# Patient Record
Sex: MALE | Race: WHITE | Employment: OTHER | URBAN - NONMETROPOLITAN AREA
[De-identification: names, ages, dates, MRNs, and addresses within clinical notes are randomized per-mention and may not be internally consistent; named-entity substitution may affect disease eponyms.]

---

## 2024-03-28 ENCOUNTER — HOSPITAL ENCOUNTER (INPATIENT)
Age: 74
LOS: 3 days | Discharge: HOME | DRG: 372 | End: 2024-03-31
Attending: STUDENT IN AN ORGANIZED HEALTH CARE EDUCATION/TRAINING PROGRAM | Admitting: SURGERY
Payer: MEDICARE

## 2024-03-28 PROBLEM — K35.80 ACUTE APPENDICITIS: Status: ACTIVE | Noted: 2024-03-28

## 2024-03-28 PROBLEM — K35.219 ACUTE APPENDICITIS WITH GENERALIZED PERITONITIS AND ABSCESS: Status: ACTIVE | Noted: 2024-03-28

## 2024-03-28 PROCEDURE — 6360000002 HC RX W HCPCS: Performed by: SURGERY

## 2024-03-28 PROCEDURE — 2580000003 HC RX 258: Performed by: SURGERY

## 2024-03-28 PROCEDURE — 1200000000 HC SEMI PRIVATE

## 2024-03-28 PROCEDURE — 99222 1ST HOSP IP/OBS MODERATE 55: CPT | Performed by: SURGERY

## 2024-03-28 RX ORDER — SODIUM CHLORIDE 0.9 % (FLUSH) 0.9 %
5-40 SYRINGE (ML) INJECTION PRN
Status: DISCONTINUED | OUTPATIENT
Start: 2024-03-28 | End: 2024-03-31 | Stop reason: HOSPADM

## 2024-03-28 RX ORDER — ROSUVASTATIN CALCIUM 10 MG/1
10 TABLET, COATED ORAL NIGHTLY
COMMUNITY

## 2024-03-28 RX ORDER — SODIUM CHLORIDE 9 MG/ML
INJECTION, SOLUTION INTRAVENOUS PRN
Status: DISCONTINUED | OUTPATIENT
Start: 2024-03-28 | End: 2024-03-31 | Stop reason: HOSPADM

## 2024-03-28 RX ORDER — POTASSIUM CHLORIDE 20 MEQ/1
40 TABLET, EXTENDED RELEASE ORAL PRN
Status: DISCONTINUED | OUTPATIENT
Start: 2024-03-28 | End: 2024-03-31 | Stop reason: HOSPADM

## 2024-03-28 RX ORDER — TADALAFIL 20 MG/1
20 TABLET ORAL PRN
COMMUNITY

## 2024-03-28 RX ORDER — UBIDECARENONE 30 MG
100 CAPSULE ORAL NIGHTLY
COMMUNITY

## 2024-03-28 RX ORDER — POTASSIUM CHLORIDE 7.45 MG/ML
10 INJECTION INTRAVENOUS PRN
Status: DISCONTINUED | OUTPATIENT
Start: 2024-03-28 | End: 2024-03-31 | Stop reason: HOSPADM

## 2024-03-28 RX ORDER — SODIUM CHLORIDE 9 MG/ML
INJECTION, SOLUTION INTRAVENOUS CONTINUOUS
Status: DISCONTINUED | OUTPATIENT
Start: 2024-03-28 | End: 2024-03-30

## 2024-03-28 RX ORDER — METOPROLOL SUCCINATE 25 MG/1
25 TABLET, EXTENDED RELEASE ORAL NIGHTLY
COMMUNITY

## 2024-03-28 RX ORDER — ASCORBIC ACID 500 MG
500 TABLET ORAL NIGHTLY
COMMUNITY

## 2024-03-28 RX ORDER — SPIRONOLACTONE 25 MG/1
12.5 TABLET ORAL NIGHTLY
Status: DISCONTINUED | OUTPATIENT
Start: 2024-03-28 | End: 2024-03-31 | Stop reason: HOSPADM

## 2024-03-28 RX ORDER — ONDANSETRON 2 MG/ML
4 INJECTION INTRAMUSCULAR; INTRAVENOUS EVERY 6 HOURS PRN
Status: DISCONTINUED | OUTPATIENT
Start: 2024-03-28 | End: 2024-03-31 | Stop reason: HOSPADM

## 2024-03-28 RX ORDER — MORPHINE SULFATE 2 MG/ML
2 INJECTION, SOLUTION INTRAMUSCULAR; INTRAVENOUS
Status: DISCONTINUED | OUTPATIENT
Start: 2024-03-28 | End: 2024-03-31 | Stop reason: HOSPADM

## 2024-03-28 RX ORDER — ROSUVASTATIN CALCIUM 10 MG/1
10 TABLET, COATED ORAL NIGHTLY
Status: DISCONTINUED | OUTPATIENT
Start: 2024-03-28 | End: 2024-03-31 | Stop reason: HOSPADM

## 2024-03-28 RX ORDER — SODIUM CHLORIDE 0.9 % (FLUSH) 0.9 %
5-40 SYRINGE (ML) INJECTION EVERY 12 HOURS SCHEDULED
Status: DISCONTINUED | OUTPATIENT
Start: 2024-03-28 | End: 2024-03-31 | Stop reason: HOSPADM

## 2024-03-28 RX ORDER — MORPHINE SULFATE 4 MG/ML
4 INJECTION, SOLUTION INTRAMUSCULAR; INTRAVENOUS
Status: DISCONTINUED | OUTPATIENT
Start: 2024-03-28 | End: 2024-03-31 | Stop reason: HOSPADM

## 2024-03-28 RX ORDER — METOPROLOL SUCCINATE 25 MG/1
25 TABLET, EXTENDED RELEASE ORAL NIGHTLY
Status: DISCONTINUED | OUTPATIENT
Start: 2024-03-28 | End: 2024-03-31 | Stop reason: HOSPADM

## 2024-03-28 RX ORDER — MAGNESIUM SULFATE IN WATER 40 MG/ML
2000 INJECTION, SOLUTION INTRAVENOUS PRN
Status: DISCONTINUED | OUTPATIENT
Start: 2024-03-28 | End: 2024-03-31 | Stop reason: HOSPADM

## 2024-03-28 RX ORDER — SPIRONOLACTONE 25 MG/1
12.5 TABLET ORAL NIGHTLY
COMMUNITY

## 2024-03-28 RX ADMIN — SODIUM CHLORIDE: 9 INJECTION, SOLUTION INTRAVENOUS at 19:11

## 2024-03-28 RX ADMIN — PIPERACILLIN AND TAZOBACTAM 4500 MG: 4; .5 INJECTION, POWDER, FOR SOLUTION INTRAVENOUS at 21:10

## 2024-03-28 ASSESSMENT — LIFESTYLE VARIABLES
HOW OFTEN DO YOU HAVE A DRINK CONTAINING ALCOHOL: MONTHLY OR LESS
HOW MANY STANDARD DRINKS CONTAINING ALCOHOL DO YOU HAVE ON A TYPICAL DAY: 1 OR 2

## 2024-03-28 NOTE — H&P
General Surgery History and Physical  Mc Moon DO    Pt Name: Fabian Cadena  MRN: [id number]  YOB: 1950  Date of evaluation: 3/28/2024  Primary Care Physician: Heaven Contreras APRN - NP  Patient evaluated as a transfer in from [facility]  Reason for evaluation: appendiceal abscess  IMPRESSIONS:   Periappendiceal abscess  Hx CAD, S/P ablation (was on eliquis until 3/24)  Left inguinal hernia  Paralytic ileus  does not have any pertinent problems on file.  PLANS:   Admit type: Inpatient  It is expected this patient's LOS will be: Greater than 2 midnights  Anticipated Disposition Upon Discharge: Home  Analgesics and antiemetics on a prn basis  IV hydration  Empiric antibiotic coverage  IT for perc drain placement  NG to LIS  DVT prophylaxis with SCD's  Home Medications as ordered  Am films  Further recommendations to follow  SUBJECTIVE:   History of Chief Complaint:    Fabian is a 73 y.o.male who presents with abdominal pain. He was admitted at [facility] on 3/24 for acute appendicitis with phlegmon. Over the next couple days, he developed abdominal distention, SBFT showing ileus. CT performed and identified abscess that may require IR drain placement. Since [facility] doesn't have this service he was transferred here. His abdominal pain is in the RLQ does not radiate. Is sharp, but has been decreasing in severity. He previously had N/V prior to NG placement, but has started having bowel movements.   Past Medical History   has a past medical history of CAD (coronary artery disease), GERD (gastroesophageal reflux disease), and Hyperlipidemia.  Past Surgical History   has a past surgical history that includes Colonoscopy and Endoscopy, colon, diagnostic.  Medications  Prior to Admission medications    Medication Sig Start Date End Date Taking? Authorizing Provider   apixaban (ELIQUIS) 5 MG TABS tablet Take 1 tablet by mouth 2 times daily   Yes Provider, MD Oanh   tadalafil (CIALIS) 20 MG  Resp  Av  Min: 16  Max: 16  Current Pulse Ox (24h):  SpO2: 98 %  Pulse Ox Range (24h):  SpO2  Av %  Min: 98 %  Max: 98 %  Oxygen Amount and Delivery:    CONSTITUTIONAL: Alert and oriented times 3, no acute distress and cooperative to examination with proper mood and affect.  SKIN: Skin color, texture, turgor normal. No rashes or lesions.  LYMPH: no cervical nodes, no inguinal nodes  HEENT: Head is normocephalic, atraumatic. EOMI, PERRLA.  NECK: Supple, symmetrical, trachea midline, no adenopathy, thyroid symmetric, not enlarged and no tenderness, skin normal.  CHEST/LUNGS: chest symmetric with normal A/P diameter, normal respiratory rate and rhythm, lungs clear to auscultation without wheezes, rales or rhonchi. No accessory muscle use. Scars None   CARDIOVASCULAR: Heart sounds are normal.  Regular rate and rhythm without murmur, gallop or rub. Normal S1 and S2. Carotid and femoral pulses 2+/4 and equal bilaterally.  ABDOMEN: Normal shape. No scar(s) present. Normal bowel sounds.  No bruits.  Soft, nondistended, no masses or organomegaly. Reducible left inguinal hernia. Percussion: Normal without hepatosplenomegally. Tenderness: absent and RLQ.  RECTAL: deferred, not clinically indicated  NEUROLOGIC: There are no focalizing motor or sensory deficits. CN II-XII are grossly intact..   EXTREMITIES: no cyanosis, no clubbing, and no edema.  LABS:   No results for input(s): \"WBC\", \"HGB\", \"HCT\", \"PLT\", \"NA\", \"K\", \"CL\", \"CO2\", \"BUN\", \"CREATININE\", \"MG\", \"PHOS\", \"CALCIUM\", \"PTT\", \"INR\", \"AST\", \"ALT\", \"BILITOT\", \"BILIDIR\", \"AMYLASE\", \"LIPASE\", \"LDH\", \"LACTA\", \"NITRU\", \"COLORU\", \"BACTERIA\" in the last 72 hours.    Invalid input(s): \"PT\", \"WBCU\", \"RBCU\", \"LEUKOCYTESUA\"  RADIOLOGY:   I have personally reviewed the following films:  CT COMPARISON OF OUTSIDE FILMS   Final Result      XR COMPARISON OF OUTSIDE FILMS   Final Result      XR COMPARISON OF OUTSIDE FILMS   Final Result      XR COMPARISON OF OUTSIDE FILMS   Final

## 2024-03-28 NOTE — PROGRESS NOTES
Pt admitted to  7 via ambulance.     Complaints: from [facility] .  Appendix ruptured     INT into the antecubital right, condition patent and no redness  IV site free of s/s of infection or infiltration. Vital signs obtained. Assessment and data collection initiated.     Two nurse skin assessment performed by Karolyn BAILEY and Olga RN. Oriented to room.     Explained patients right to have family, representative or physician notified of their admission.  Patient has Declined for physician to be notified.  Patient has Declined for family/representative to be notified.    The patient is interested in [facility] meds to beds program?:  Yes    Policies and procedures for  explained. All questions answered with no further questions at this time. Fall prevention and safety brochure discussed with patient.  Bed alarm on. Call light in reach.

## 2024-03-29 ENCOUNTER — APPOINTMENT (OUTPATIENT)
Dept: GENERAL RADIOLOGY | Age: 74
DRG: 372 | End: 2024-03-29
Attending: STUDENT IN AN ORGANIZED HEALTH CARE EDUCATION/TRAINING PROGRAM
Payer: MEDICARE

## 2024-03-29 LAB
ANION GAP SERPL CALC-SCNC: 16 MEQ/L (ref 8–16)
BASOPHILS ABSOLUTE: 0 THOU/MM3 (ref 0–0.1)
BASOPHILS NFR BLD AUTO: 0.4 %
BUN SERPL-MCNC: 16 MG/DL (ref 7–22)
CALCIUM SERPL-MCNC: 8.1 MG/DL (ref 8.5–10.5)
CHLORIDE SERPL-SCNC: 107 MEQ/L (ref 98–111)
CO2 SERPL-SCNC: 19 MEQ/L (ref 23–33)
CREAT SERPL-MCNC: 0.8 MG/DL (ref 0.4–1.2)
DEPRECATED RDW RBC AUTO: 46.4 FL (ref 35–45)
EOSINOPHIL NFR BLD AUTO: 1.6 %
EOSINOPHILS ABSOLUTE: 0.1 THOU/MM3 (ref 0–0.4)
ERYTHROCYTE [DISTWIDTH] IN BLOOD BY AUTOMATED COUNT: 13.1 % (ref 11.5–14.5)
GFR SERPL CREATININE-BSD FRML MDRD: > 90 ML/MIN/1.73M2
GLUCOSE SERPL-MCNC: 86 MG/DL (ref 70–108)
HCT VFR BLD AUTO: 37.2 % (ref 42–52)
HGB BLD-MCNC: 12.6 GM/DL (ref 14–18)
IMM GRANULOCYTES # BLD AUTO: 0.06 THOU/MM3 (ref 0–0.07)
IMM GRANULOCYTES NFR BLD AUTO: 1.1 %
LYMPHOCYTES ABSOLUTE: 1.2 THOU/MM3 (ref 1–4.8)
LYMPHOCYTES NFR BLD AUTO: 20.2 %
MCH RBC QN AUTO: 32.6 PG (ref 26–33)
MCHC RBC AUTO-ENTMCNC: 33.9 GM/DL (ref 32.2–35.5)
MCV RBC AUTO: 96.4 FL (ref 80–94)
MONOCYTES ABSOLUTE: 0.6 THOU/MM3 (ref 0.4–1.3)
MONOCYTES NFR BLD AUTO: 11.4 %
NEUTROPHILS NFR BLD AUTO: 65.3 %
NRBC BLD AUTO-RTO: 0 /100 WBC
PLATELET # BLD AUTO: 152 THOU/MM3 (ref 130–400)
PMV BLD AUTO: 10.3 FL (ref 9.4–12.4)
POTASSIUM SERPL-SCNC: 3.7 MEQ/L (ref 3.5–5.2)
RBC # BLD AUTO: 3.86 MILL/MM3 (ref 4.7–6.1)
SCAN OF BLOOD SMEAR: NORMAL
SEGMENTED NEUTROPHILS ABSOLUTE COUNT: 3.7 THOU/MM3 (ref 1.8–7.7)
SODIUM SERPL-SCNC: 142 MEQ/L (ref 135–145)
WBC # BLD AUTO: 5.7 THOU/MM3 (ref 4.8–10.8)

## 2024-03-29 PROCEDURE — 2580000003 HC RX 258: Performed by: SURGERY

## 2024-03-29 PROCEDURE — 6360000002 HC RX W HCPCS: Performed by: SURGERY

## 2024-03-29 PROCEDURE — 80048 BASIC METABOLIC PNL TOTAL CA: CPT

## 2024-03-29 PROCEDURE — 6370000000 HC RX 637 (ALT 250 FOR IP): Performed by: SURGERY

## 2024-03-29 PROCEDURE — 74018 RADEX ABDOMEN 1 VIEW: CPT

## 2024-03-29 PROCEDURE — 1200000000 HC SEMI PRIVATE

## 2024-03-29 PROCEDURE — 36415 COLL VENOUS BLD VENIPUNCTURE: CPT

## 2024-03-29 PROCEDURE — 99231 SBSQ HOSP IP/OBS SF/LOW 25: CPT | Performed by: SURGERY

## 2024-03-29 PROCEDURE — 85025 COMPLETE CBC W/AUTO DIFF WBC: CPT

## 2024-03-29 RX ADMIN — SACUBITRIL AND VALSARTAN 1 TABLET: 24; 26 TABLET, FILM COATED ORAL at 20:54

## 2024-03-29 RX ADMIN — ROSUVASTATIN 10 MG: 10 TABLET, FILM COATED ORAL at 20:55

## 2024-03-29 RX ADMIN — METOPROLOL SUCCINATE 25 MG: 25 TABLET, EXTENDED RELEASE ORAL at 20:55

## 2024-03-29 RX ADMIN — PIPERACILLIN AND TAZOBACTAM 3375 MG: 3; .375 INJECTION, POWDER, LYOPHILIZED, FOR SOLUTION INTRAVENOUS at 14:42

## 2024-03-29 RX ADMIN — PIPERACILLIN AND TAZOBACTAM 3375 MG: 3; .375 INJECTION, POWDER, LYOPHILIZED, FOR SOLUTION INTRAVENOUS at 03:58

## 2024-03-29 RX ADMIN — PIPERACILLIN AND TAZOBACTAM 3375 MG: 3; .375 INJECTION, POWDER, LYOPHILIZED, FOR SOLUTION INTRAVENOUS at 22:10

## 2024-03-29 ASSESSMENT — PAIN DESCRIPTION - ORIENTATION: ORIENTATION: RIGHT

## 2024-03-29 ASSESSMENT — PAIN - FUNCTIONAL ASSESSMENT: PAIN_FUNCTIONAL_ASSESSMENT: ACTIVITIES ARE NOT PREVENTED

## 2024-03-29 ASSESSMENT — PAIN DESCRIPTION - PAIN TYPE: TYPE: SURGICAL PAIN

## 2024-03-29 ASSESSMENT — PAIN DESCRIPTION - LOCATION: LOCATION: ABDOMEN

## 2024-03-29 ASSESSMENT — PAIN SCALES - GENERAL: PAINLEVEL_OUTOF10: 8

## 2024-03-29 ASSESSMENT — PAIN DESCRIPTION - DESCRIPTORS: DESCRIPTORS: BURNING

## 2024-03-29 NOTE — PLAN OF CARE
Problem: Discharge Planning  Goal: Discharge to home or other facility with appropriate resources  Outcome: Progressing  Flowsheets (Taken 3/29/2024 1705)  Discharge to home or other facility with appropriate resources: Identify barriers to discharge with patient and caregiver     Problem: Pain  Goal: Verbalizes/displays adequate comfort level or baseline comfort level  Outcome: Progressing  Flowsheets (Taken 3/29/2024 1705)  Verbalizes/displays adequate comfort level or baseline comfort level: Encourage patient to monitor pain and request assistance     Problem: Skin/Tissue Integrity - Adult  Goal: Skin integrity remains intact  Outcome: Progressing  Flowsheets (Taken 3/29/2024 1705)  Skin Integrity Remains Intact: Monitor for areas of redness and/or skin breakdown     Problem: Gastrointestinal - Adult  Goal: Minimal or absence of nausea and vomiting  Outcome: Progressing  Flowsheets (Taken 3/29/2024 1705)  Minimal or absence of nausea and vomiting: Administer IV fluids as ordered to ensure adequate hydration     Problem: Gastrointestinal - Adult  Goal: Maintains or returns to baseline bowel function  Outcome: Progressing  Flowsheets (Taken 3/29/2024 1705)  Maintains or returns to baseline bowel function: Assess bowel function     Problem: Infection - Adult  Goal: Absence of infection at discharge  Outcome: Progressing  Flowsheets (Taken 3/29/2024 1705)  Absence of infection at discharge: Assess and monitor for signs and symptoms of infection     Problem: Metabolic/Fluid and Electrolytes - Adult  Goal: Electrolytes maintained within normal limits  Outcome: Progressing  Flowsheets (Taken 3/29/2024 1705)  Electrolytes maintained within normal limits: Monitor labs and assess patient for signs and symptoms of electrolyte imbalances   Care plan reviewed with patient.  Patient verbalize understanding of the plan of care and contribute to goal setting.

## 2024-03-29 NOTE — PROGRESS NOTES
1100 Report taken from nurse Tawny BAILEY.    Head to Toe Assessment    [facility] Student Nurse  Head to Toe Assessment Performed at 1200  Skin  General skin appearance / Description: tan, dry, warm  Incisions / Wounds: none    Neuro/Head  LOC: A+O x 4  Speech: clear and appropriate; responds to commands  Eyes PERRLA 4 mm down to 2 mm, pupils round and equal, with consensual response  Symmetry of face / tongue: symmetrical      Chest  Heart sounds / Apical Pulse: rate: 69, regular rhythm, amplitude +2  Dyspnea: no trouble breathing  Lung sounds: rate: 18, clear, regular rhythm, symmetrical chest movement, moderate depth      Abdomen/ Pelvis  Bowel sounds: hypoactive all four quadrants  Palpation / Inspection: no pain, tenderness, or masses  Last BM: 3/29/2024 in the morning  Stool assessment: loose, liquid      Extremities  Edema: no edema present  Altered Sensation: no tingling or numbness, movement purposeful upper and lower extremities Left Upper extremity Capillary Refill: less than 3 seconds    Right Upper extremity Capillary Refill: less than three seconds  Lower extremity pedal push / pulls: strong and equal bilaterally   Left pedal pulse: strong   Left posterior tibialis pulse: strong     Right pedal pulse: strong   Right posterior tibialis pulse: strong     Drift of extremities: not present  Pain: patient reported pain as 0 on scale of 0-10    Other issues: potentially removing NG tube if clear liquid diet tolerated well  Patient alert and oriented x4. Speech clear and appropriated and responds to commands.  PERRLA 4mm down to 2mm with light.  Hand grasp strong and equal bilaterally. Drift not present.  Skin turgor and capillary refill BUE < 3. Skin color appropriate for ethnicity, warm, dry. No signs of bruising or wounds. Heels rough and red. Sensation present in both upper and lower extremities, no pain tingling or numbness, movement purposeful.  Pt has NG tube in right nostril, secured in place

## 2024-03-29 NOTE — PROGRESS NOTES
Order received for abscess drain placement and reviewed by Dr Moore. Per Dr Moore, fluid collection is too small to drain. Patient's nurse Rosa updated.

## 2024-03-29 NOTE — PROGRESS NOTES
NG tube removed per Dr. Steiner covering for Dr. Moon. Pt tolerated well. No nausea or vomiting. Pt tolerating clear liquids.

## 2024-03-29 NOTE — PROGRESS NOTES
DO [address] [facility] GENERAL SURGERY   General Surgery Daily Progress Note    Pt Name: Fabian Cadena  Medical Record Number: [id number]  Date of Birth 1950   Today's Date: 3/29/2024  Chief complaint: Abd pain  ASSESSMENT   Hospital day # 1   Acute appendicitis with periappendiceal abscess (too small to drain per IR)  Secondary ileus   has a past medical history of CAD (coronary artery disease), GERD (gastroesophageal reflux disease), and Hyperlipidemia.  PLAN   Decrease IV hydration  Analgesics and antiemetics as needed  Clamp NG, clear liquids, if tolerated, remove NG  Empiric antibiotic coverage  Increase activity  Plan for discharge on oral antibiotics once tolerating diet.   SUBJECTIVE   Fabian is doing better. He denies any nausea or vomiting, has not passed flatus not had a bowel movement since yesterday. He is tolerating a Diet NPO Exceptions are: Sips of Water with Meds, Ice Chips. His pain is well controlled on current medications. He has been ambulating in the halls.   CURRENT MEDICATIONS   Scheduled Meds:   piperacillin-tazobactam  3,375 mg IntraVENous Q8H    empagliflozin  10 mg Oral Nightly    metoprolol succinate  25 mg Oral Nightly    rosuvastatin  10 mg Oral Nightly    sacubitril-valsartan  1 tablet Oral BID    spironolactone  12.5 mg Oral Nightly    sodium chloride flush  5-40 mL IntraVENous 2 times per day    naloxegol  12.5 mg Oral QAM     Continuous Infusions:   sodium chloride 125 mL/hr at 03/29/24 0621    sodium chloride       PRN Meds:.sodium chloride flush, sodium chloride, potassium chloride **OR** potassium alternative oral replacement **OR** potassium chloride, magnesium sulfate, morphine **OR** morphine, ondansetron  OBJECTIVE   CURRENT VITALS:  height is 1.54 m (5' 0.63\") and weight is 82.1 kg (181 lb). His oral temperature is 98.2 °F (36.8 °C). His blood pressure is 148/74 (abnormal) and his pulse is 67. His respiration is 18 and oxygen saturation is 95%.   Temperature

## 2024-03-30 LAB
ANION GAP SERPL CALC-SCNC: 11 MEQ/L (ref 8–16)
BASOPHILS ABSOLUTE: 0 THOU/MM3 (ref 0–0.1)
BASOPHILS NFR BLD AUTO: 0.5 %
BUN SERPL-MCNC: 10 MG/DL (ref 7–22)
CALCIUM SERPL-MCNC: 7.9 MG/DL (ref 8.5–10.5)
CHLORIDE SERPL-SCNC: 104 MEQ/L (ref 98–111)
CO2 SERPL-SCNC: 21 MEQ/L (ref 23–33)
CREAT SERPL-MCNC: 0.8 MG/DL (ref 0.4–1.2)
DEPRECATED RDW RBC AUTO: 45.4 FL (ref 35–45)
EOSINOPHIL NFR BLD AUTO: 2.2 %
EOSINOPHILS ABSOLUTE: 0.1 THOU/MM3 (ref 0–0.4)
ERYTHROCYTE [DISTWIDTH] IN BLOOD BY AUTOMATED COUNT: 12.8 % (ref 11.5–14.5)
GFR SERPL CREATININE-BSD FRML MDRD: > 90 ML/MIN/1.73M2
GLUCOSE SERPL-MCNC: 90 MG/DL (ref 70–108)
HCT VFR BLD AUTO: 38.1 % (ref 42–52)
HGB BLD-MCNC: 12.7 GM/DL (ref 14–18)
IMM GRANULOCYTES # BLD AUTO: 0.06 THOU/MM3 (ref 0–0.07)
IMM GRANULOCYTES NFR BLD AUTO: 1 %
LYMPHOCYTES ABSOLUTE: 1.2 THOU/MM3 (ref 1–4.8)
LYMPHOCYTES NFR BLD AUTO: 18.3 %
MAGNESIUM SERPL-MCNC: 1.9 MG/DL (ref 1.6–2.4)
MCH RBC QN AUTO: 32.1 PG (ref 26–33)
MCHC RBC AUTO-ENTMCNC: 33.3 GM/DL (ref 32.2–35.5)
MCV RBC AUTO: 96.2 FL (ref 80–94)
MONOCYTES ABSOLUTE: 0.6 THOU/MM3 (ref 0.4–1.3)
MONOCYTES NFR BLD AUTO: 9.7 %
NEUTROPHILS NFR BLD AUTO: 68.3 %
NRBC BLD AUTO-RTO: 0 /100 WBC
PLATELET # BLD AUTO: 170 THOU/MM3 (ref 130–400)
PLATELET BLD QL SMEAR: ADEQUATE
PMV BLD AUTO: 9.8 FL (ref 9.4–12.4)
POTASSIUM SERPL-SCNC: 3.4 MEQ/L (ref 3.5–5.2)
RBC # BLD AUTO: 3.96 MILL/MM3 (ref 4.7–6.1)
SCAN OF BLOOD SMEAR: NORMAL
SEGMENTED NEUTROPHILS ABSOLUTE COUNT: 4.3 THOU/MM3 (ref 1.8–7.7)
SODIUM SERPL-SCNC: 136 MEQ/L (ref 135–145)
TOXIC GRANULES BLD QL SMEAR: PRESENT
WBC # BLD AUTO: 6.3 THOU/MM3 (ref 4.8–10.8)

## 2024-03-30 PROCEDURE — 83735 ASSAY OF MAGNESIUM: CPT

## 2024-03-30 PROCEDURE — 99232 SBSQ HOSP IP/OBS MODERATE 35: CPT | Performed by: SURGERY

## 2024-03-30 PROCEDURE — 2580000003 HC RX 258: Performed by: SURGERY

## 2024-03-30 PROCEDURE — 6370000000 HC RX 637 (ALT 250 FOR IP): Performed by: SURGERY

## 2024-03-30 PROCEDURE — 6360000002 HC RX W HCPCS: Performed by: SURGERY

## 2024-03-30 PROCEDURE — 36415 COLL VENOUS BLD VENIPUNCTURE: CPT

## 2024-03-30 PROCEDURE — 1200000000 HC SEMI PRIVATE

## 2024-03-30 PROCEDURE — 85025 COMPLETE CBC W/AUTO DIFF WBC: CPT

## 2024-03-30 PROCEDURE — 80048 BASIC METABOLIC PNL TOTAL CA: CPT

## 2024-03-30 RX ADMIN — ROSUVASTATIN 10 MG: 10 TABLET, FILM COATED ORAL at 19:51

## 2024-03-30 RX ADMIN — PIPERACILLIN AND TAZOBACTAM 3375 MG: 3; .375 INJECTION, POWDER, LYOPHILIZED, FOR SOLUTION INTRAVENOUS at 21:53

## 2024-03-30 RX ADMIN — SODIUM CHLORIDE, PRESERVATIVE FREE 10 ML: 5 INJECTION INTRAVENOUS at 21:45

## 2024-03-30 RX ADMIN — SACUBITRIL AND VALSARTAN 1 TABLET: 24; 26 TABLET, FILM COATED ORAL at 08:15

## 2024-03-30 RX ADMIN — PIPERACILLIN AND TAZOBACTAM 3375 MG: 3; .375 INJECTION, POWDER, LYOPHILIZED, FOR SOLUTION INTRAVENOUS at 05:46

## 2024-03-30 RX ADMIN — SODIUM CHLORIDE: 9 INJECTION, SOLUTION INTRAVENOUS at 05:49

## 2024-03-30 RX ADMIN — METOPROLOL SUCCINATE 25 MG: 25 TABLET, EXTENDED RELEASE ORAL at 19:51

## 2024-03-30 RX ADMIN — EMPAGLIFLOZIN 10 MG: 10 TABLET, FILM COATED ORAL at 19:51

## 2024-03-30 RX ADMIN — PIPERACILLIN AND TAZOBACTAM 3375 MG: 3; .375 INJECTION, POWDER, LYOPHILIZED, FOR SOLUTION INTRAVENOUS at 14:15

## 2024-03-30 RX ADMIN — NALOXEGOL OXALATE 12.5 MG: 12.5 TABLET, FILM COATED ORAL at 08:16

## 2024-03-30 RX ADMIN — POTASSIUM CHLORIDE 40 MEQ: 1500 TABLET, EXTENDED RELEASE ORAL at 06:31

## 2024-03-30 RX ADMIN — SACUBITRIL AND VALSARTAN 1 TABLET: 24; 26 TABLET, FILM COATED ORAL at 19:51

## 2024-03-30 ASSESSMENT — PAIN SCALES - GENERAL: PAINLEVEL_OUTOF10: 0

## 2024-03-30 NOTE — PLAN OF CARE
Problem: Discharge Planning  Goal: Discharge to home or other facility with appropriate resources  3/29/2024 2241 by Amparo Winslow RN  Outcome: Progressing  Flowsheets (Taken 3/29/2024 2241)  Discharge to home or other facility with appropriate resources:   Identify barriers to discharge with patient and caregiver   Identify discharge learning needs (meds, wound care, etc)   Arrange for needed discharge resources and transportation as appropriate     Problem: Pain  Goal: Verbalizes/displays adequate comfort level or baseline comfort level  3/29/2024 2241 by Amparo Winslow RN  Outcome: Progressing  Flowsheets (Taken 3/29/2024 2241)  Verbalizes/displays adequate comfort level or baseline comfort level:   Encourage patient to monitor pain and request assistance   Administer analgesics based on type and severity of pain and evaluate response   Assess pain using appropriate pain scale   Implement non-pharmacological measures as appropriate and evaluate response     Problem: Skin/Tissue Integrity - Adult  Goal: Skin integrity remains intact  3/29/2024 2241 by Amparo Winslow RN  Outcome: Progressing  Flowsheets  Taken 3/29/2024 2241 by Amparo Winslow RN  Skin Integrity Remains Intact:   Monitor for areas of redness and/or skin breakdown   Assess vascular access sites hourly  Taken 3/29/2024 1707 by Tawny Hunt RN  Skin Integrity Remains Intact: Monitor for areas of redness and/or skin breakdown     Problem: Gastrointestinal - Adult  Goal: Minimal or absence of nausea and vomiting  3/29/2024 2241 by Amparo Winslow RN  Outcome: Progressing  Flowsheets (Taken 3/29/2024 2241)  Minimal or absence of nausea and vomiting:   Administer IV fluids as ordered to ensure adequate hydration   Administer ordered antiemetic medications as needed   Provide nonpharmacologic comfort measures as appropriate     Problem: Infection - Adult  Goal: Absence of infection at discharge  3/29/2024 2241 by Amparo Winslow RN  Outcome: Progressing  Flowsheets  (Taken 3/29/2024 2241)  Absence of infection at discharge:   Assess and monitor for signs and symptoms of infection   Monitor lab/diagnostic results   Monitor all insertion sites i.e., indwelling lines, tubes and drains   Administer medications as ordered   Instruct and encourage patient and family to use good hand hygiene technique     Problem: Metabolic/Fluid and Electrolytes - Adult  Goal: Electrolytes maintained within normal limits  3/29/2024 2241 by Amparo Winslow RN  Outcome: Progressing  Flowsheets (Taken 3/29/2024 2241)  Electrolytes maintained within normal limits: Monitor labs and assess patient for signs and symptoms of electrolyte imbalances     Problem: Safety - Adult  Goal: Free from fall injury  Outcome: Progressing  Flowsheets (Taken 3/29/2024 2241)  Free From Fall Injury: Instruct family/caregiver on patient safety   Care plan reviewed with patient.  Patient  verbalize understanding of the plan of care and contribute to goal setting.

## 2024-03-30 NOTE — PROGRESS NOTES
Harsha Mathew MD  [facility]   General Surgery Daily Progress Note    Pt Name: Fabian Cadena  Medical Record Number: [id number]  Date of Birth 1950   Today's Date: 3/30/2024  Chief complaint: Abd pain  ASSESSMENT   Hospital day # 2   Acute appendicitis with periappendiceal abscess (too small to drain per IR)  Secondary ileus resolving     PLAN   IV to INT  Analgesics and antiemetics as needed  Increase diet  Empiric antibiotic coverage  Increase activity  Plan for discharge on oral antibiotics once tolerating diet.   SUBJECTIVE   Fabian is doing better. He denies any nausea or vomiting, has passed flatus and  had 4 bowel movements since yesterday. He is tolerating a ADULT DIET; Regular. His pain is well controlled on current medications. He has been ambulating in the halls.   CURRENT MEDICATIONS   Scheduled Meds:   piperacillin-tazobactam  3,375 mg IntraVENous Q8H    empagliflozin  10 mg Oral Nightly    metoprolol succinate  25 mg Oral Nightly    rosuvastatin  10 mg Oral Nightly    sacubitril-valsartan  1 tablet Oral BID    spironolactone  12.5 mg Oral Nightly    sodium chloride flush  5-40 mL IntraVENous 2 times per day    naloxegol  12.5 mg Oral QAM     Continuous Infusions:   sodium chloride       PRN Meds:.sodium chloride flush, sodium chloride, potassium chloride **OR** potassium alternative oral replacement **OR** potassium chloride, magnesium sulfate, morphine **OR** morphine, ondansetron  OBJECTIVE   CURRENT VITALS:  height is 1.54 m (5' 0.63\") and weight is 82.1 kg (181 lb). His oral temperature is 98.6 °F (37 °C). His blood pressure is 154/73 (abnormal) and his pulse is 67. His respiration is 16 and oxygen saturation is 97%.   Temperature Range (24h):Temp: 98.6 °F (37 °C) Temp  Av.8 °F (37.1 °C)  Min: 98.6 °F (37 °C)  Max: 99.3 °F (37.4 °C)  BP Range (24h): Systolic (24hrs), Av , Min:128 , Max:154     Diastolic (24hrs), Av, Min:62, Max:78    Pulse Range (24h): Pulse  Avg:  OF OUTSIDE FILMS   Final Result            Electronically signed by Harsha Mathew MD on 3/30/2024 at 7:54 AM

## 2024-03-30 NOTE — PLAN OF CARE
Problem: Discharge Planning  Goal: Discharge to home or other facility with appropriate resources  3/30/2024 1015 by Tawny Hunt RN  Outcome: Progressing  Flowsheets (Taken 3/30/2024 1015)  Discharge to home or other facility with appropriate resources: Identify barriers to discharge with patient and caregiver     Problem: Pain  Goal: Verbalizes/displays adequate comfort level or baseline comfort level  3/30/2024 1015 by Tawny Hunt RN  Outcome: Progressing  Flowsheets (Taken 3/30/2024 1015)  Verbalizes/displays adequate comfort level or baseline comfort level: Encourage patient to monitor pain and request assistance     Problem: Skin/Tissue Integrity - Adult  Goal: Skin integrity remains intact  3/30/2024 1015 by Tawny Hunt RN  Outcome: Progressing  Flowsheets (Taken 3/30/2024 1015)  Skin Integrity Remains Intact: Monitor for areas of redness and/or skin breakdown     Problem: Gastrointestinal - Adult  Goal: Minimal or absence of nausea and vomiting  3/30/2024 1015 by Tawny Hunt RN  Outcome: Progressing  Flowsheets (Taken 3/30/2024 1015)  Minimal or absence of nausea and vomiting: Administer IV fluids as ordered to ensure adequate hydration     Problem: Gastrointestinal - Adult  Goal: Maintains or returns to baseline bowel function  3/30/2024 1015 by Tawny Hunt RN  Outcome: Progressing  Flowsheets (Taken 3/30/2024 1015)  Maintains or returns to baseline bowel function: Assess bowel function     Problem: Infection - Adult  Goal: Absence of infection at discharge  3/30/2024 1015 by Tawny Hunt RN  Outcome: Progressing  Flowsheets (Taken 3/30/2024 1015)  Absence of infection at discharge: Assess and monitor for signs and symptoms of infection     Problem: Metabolic/Fluid and Electrolytes - Adult  Goal: Electrolytes maintained within normal limits  3/30/2024 1015 by Tawny Hunt RN  Outcome: Progressing  Flowsheets (Taken 3/30/2024 1015)  Electrolytes maintained within normal limits: Monitor

## 2024-03-31 VITALS
DIASTOLIC BLOOD PRESSURE: 80 MMHG | SYSTOLIC BLOOD PRESSURE: 123 MMHG | BODY MASS INDEX: 34.17 KG/M2 | WEIGHT: 181 LBS | HEART RATE: 79 BPM | RESPIRATION RATE: 17 BRPM | HEIGHT: 61 IN | TEMPERATURE: 98.6 F | OXYGEN SATURATION: 94 %

## 2024-03-31 LAB
ANION GAP SERPL CALC-SCNC: 13 MEQ/L (ref 8–16)
BASOPHILS ABSOLUTE: 0 THOU/MM3 (ref 0–0.1)
BASOPHILS NFR BLD AUTO: 0.4 %
BUN SERPL-MCNC: 9 MG/DL (ref 7–22)
BURR CELLS BLD QL SMEAR: ABNORMAL
CALCIUM SERPL-MCNC: 8.4 MG/DL (ref 8.5–10.5)
CHLORIDE SERPL-SCNC: 105 MEQ/L (ref 98–111)
CO2 SERPL-SCNC: 20 MEQ/L (ref 23–33)
CREAT SERPL-MCNC: 0.8 MG/DL (ref 0.4–1.2)
DEPRECATED RDW RBC AUTO: 45.1 FL (ref 35–45)
EOSINOPHIL NFR BLD AUTO: 2.4 %
EOSINOPHILS ABSOLUTE: 0.2 THOU/MM3 (ref 0–0.4)
ERYTHROCYTE [DISTWIDTH] IN BLOOD BY AUTOMATED COUNT: 13 % (ref 11.5–14.5)
GFR SERPL CREATININE-BSD FRML MDRD: > 90 ML/MIN/1.73M2
GLUCOSE SERPL-MCNC: 93 MG/DL (ref 70–108)
HCT VFR BLD AUTO: 40.4 % (ref 42–52)
HGB BLD-MCNC: 13.5 GM/DL (ref 14–18)
IMM GRANULOCYTES # BLD AUTO: 0.08 THOU/MM3 (ref 0–0.07)
IMM GRANULOCYTES NFR BLD AUTO: 1 %
LYMPHOCYTES ABSOLUTE: 1.5 THOU/MM3 (ref 1–4.8)
LYMPHOCYTES NFR BLD AUTO: 19.9 %
MCH RBC QN AUTO: 31.7 PG (ref 26–33)
MCHC RBC AUTO-ENTMCNC: 33.4 GM/DL (ref 32.2–35.5)
MCV RBC AUTO: 94.8 FL (ref 80–94)
MONOCYTES ABSOLUTE: 0.6 THOU/MM3 (ref 0.4–1.3)
MONOCYTES NFR BLD AUTO: 7.5 %
NEUTROPHILS NFR BLD AUTO: 68.8 %
NRBC BLD AUTO-RTO: 0 /100 WBC
PLATELET # BLD AUTO: 207 THOU/MM3 (ref 130–400)
PLATELET BLD QL SMEAR: ADEQUATE
PMV BLD AUTO: 10.1 FL (ref 9.4–12.4)
POTASSIUM SERPL-SCNC: 3.6 MEQ/L (ref 3.5–5.2)
RBC # BLD AUTO: 4.26 MILL/MM3 (ref 4.7–6.1)
SCAN OF BLOOD SMEAR: NORMAL
SEGMENTED NEUTROPHILS ABSOLUTE COUNT: 5.3 THOU/MM3 (ref 1.8–7.7)
SODIUM SERPL-SCNC: 138 MEQ/L (ref 135–145)
VARIANT LYMPHS BLD QL SMEAR: ABNORMAL %
WBC # BLD AUTO: 7.7 THOU/MM3 (ref 4.8–10.8)

## 2024-03-31 PROCEDURE — 36415 COLL VENOUS BLD VENIPUNCTURE: CPT

## 2024-03-31 PROCEDURE — 99238 HOSP IP/OBS DSCHRG MGMT 30/<: CPT | Performed by: SURGERY

## 2024-03-31 PROCEDURE — 85025 COMPLETE CBC W/AUTO DIFF WBC: CPT

## 2024-03-31 PROCEDURE — 80048 BASIC METABOLIC PNL TOTAL CA: CPT

## 2024-03-31 PROCEDURE — 6360000002 HC RX W HCPCS: Performed by: SURGERY

## 2024-03-31 PROCEDURE — 6370000000 HC RX 637 (ALT 250 FOR IP): Performed by: SURGERY

## 2024-03-31 PROCEDURE — 2580000003 HC RX 258: Performed by: SURGERY

## 2024-03-31 RX ORDER — AMOXICILLIN AND CLAVULANATE POTASSIUM 875; 125 MG/1; MG/1
1 TABLET, FILM COATED ORAL 2 TIMES DAILY
Qty: 14 TABLET | Refills: 0 | Status: SHIPPED | OUTPATIENT
Start: 2024-03-31 | End: 2024-04-07

## 2024-03-31 RX ADMIN — PIPERACILLIN AND TAZOBACTAM 3375 MG: 3; .375 INJECTION, POWDER, LYOPHILIZED, FOR SOLUTION INTRAVENOUS at 06:32

## 2024-03-31 RX ADMIN — SACUBITRIL AND VALSARTAN 1 TABLET: 24; 26 TABLET, FILM COATED ORAL at 09:05

## 2024-03-31 RX ADMIN — NALOXEGOL OXALATE 12.5 MG: 12.5 TABLET, FILM COATED ORAL at 09:05

## 2024-03-31 ASSESSMENT — PAIN SCALES - GENERAL: PAINLEVEL_OUTOF10: 0

## 2024-03-31 NOTE — DISCHARGE SUMMARY
[facility]  DISCHARGE SUMMARY  Pt Name: Fabian Cadena  MRN: [id number]  YOB: 1950  Primary Care Physician: Heaven Contreras APRN - NP  Admit date:  3/28/2024  2:05 PM  Discharge date:  No discharge date for patient encounter.  Disposition: home  Admitting Diagnosis: No diagnosis found.  Discharge Diagnosis:   Patient Active Problem List   Diagnosis Code    Acute appendicitis K35.80    Acute appendicitis with generalized peritonitis and abscess K35.219     Discharge condition:  good  Consultants:  none  Procedures/Diagnostic Test:  none  Hospital Course: Fabian originally presented to the hospital on 3/28/2024  2:05 PM with abscess following appendectomy at OSH. He was admitted with plans for IR drainaeg but the collection was too small, he also had an ileus. Both of these issues wre managed non operatively. Clinically patient improved and started to have bowel function. His diet was advanced. His WBC normalized and he was discahrged homes  At time of discharge, Fabian was tolerating a regular diet, having bowel movements,ambulating on his own accord and had adequate analgesia on oral pain medications, and had no signs of symptoms of complications.  PHYSICAL EXAMINATION   Discharge Vitals:  height is 1.54 m (5' 0.63\") and weight is 82.1 kg (181 lb). His oral temperature is 98.6 °F (37 °C). His blood pressure is 123/80 and his pulse is 79. His respiration is 17 and oxygen saturation is 94%.   General appearance - alert, well appearing, and in no distress  Chest - clear to ausculation  Heart - normal rate and regular rhythm  Abdomen - soft, incisional tenderness only, bowel sounds present  Neurological - motor and sensory grossly normal bilaterally  Musculoskeletal - full range of motion without pain  Extremities - peripheral pulses normal, no pedal edema, no clubbing or cyanosis  Incision: healing well, no drainage  LABS     Recent Labs     03/31/24  0519   WBC 7.7   HGB 13.5*   HCT

## 2024-03-31 NOTE — PROGRESS NOTES
Patient ready for discharge, AVS provided all questions and concerns addressed, script given to patient to fill at any pharmacy. Patient to be driven home by wife

## 2024-03-31 NOTE — PLAN OF CARE
Problem: Discharge Planning  Goal: Discharge to home or other facility with appropriate resources  Outcome: Adequate for Discharge     Problem: Pain  Goal: Verbalizes/displays adequate comfort level or baseline comfort level  Outcome: Adequate for Discharge     Problem: Skin/Tissue Integrity - Adult  Goal: Skin integrity remains intact  Outcome: Adequate for Discharge     Problem: Gastrointestinal - Adult  Goal: Minimal or absence of nausea and vomiting  Outcome: Adequate for Discharge  Goal: Maintains or returns to baseline bowel function  Outcome: Adequate for Discharge     Problem: Infection - Adult  Goal: Absence of infection at discharge  Outcome: Adequate for Discharge     Problem: Metabolic/Fluid and Electrolytes - Adult  Goal: Electrolytes maintained within normal limits  Outcome: Adequate for Discharge     Problem: Safety - Adult  Goal: Free from fall injury  Outcome: Adequate for Discharge

## 2024-04-12 PROBLEM — I25.10 CORONARY ATHEROSCLEROSIS: Status: ACTIVE | Noted: 2023-03-21

## 2024-04-12 PROBLEM — I48.19 PERSISTENT ATRIAL FIBRILLATION (HCC): Status: ACTIVE | Noted: 2023-03-21

## 2024-04-12 PROBLEM — K52.9 COLITIS: Status: ACTIVE | Noted: 2024-04-12

## 2024-04-12 PROBLEM — I50.42 CHRONIC COMBINED SYSTOLIC AND DIASTOLIC CONGESTIVE HEART FAILURE (HCC): Status: ACTIVE | Noted: 2023-05-10

## 2024-04-16 ENCOUNTER — OFFICE VISIT (OUTPATIENT)
Dept: SURGERY | Age: 74
End: 2024-04-16
Payer: COMMERCIAL

## 2024-04-16 VITALS
HEIGHT: 61 IN | DIASTOLIC BLOOD PRESSURE: 64 MMHG | SYSTOLIC BLOOD PRESSURE: 118 MMHG | WEIGHT: 178.8 LBS | OXYGEN SATURATION: 98 % | TEMPERATURE: 97.4 F | BODY MASS INDEX: 33.76 KG/M2 | RESPIRATION RATE: 16 BRPM | HEART RATE: 65 BPM

## 2024-04-16 DIAGNOSIS — K35.210 ACUTE APPENDICITIS WITH GENERALIZED PERITONITIS AND ABSCESS, WITHOUT GANGRENE OR PERFORATION: Primary | ICD-10-CM

## 2024-04-16 PROCEDURE — 99214 OFFICE O/P EST MOD 30 MIN: CPT | Performed by: SURGERY

## 2024-04-16 PROCEDURE — 1123F ACP DISCUSS/DSCN MKR DOCD: CPT | Performed by: SURGERY

## 2024-04-16 NOTE — PROGRESS NOTES
PETRONA Ashton  [facility] GENERAL SURGERY  [address]  [place] [address]  [phone]  Hospital Follow-up Evaluation in Office    Pt Name: Fabian Cadena  Date of Birth 1950   Today's Date: 4/16/2024  Medical Record Number: [id number]  Referring Provider: No ref. provider found  Primary Care Provider: Heaven Contreras APRN - NP  Chief Complaint   Patient presents with    Follow-Up from Hospital     S/p Periappendiceal abscess 3/28/24     ASSESSMENT       Diagnosis Orders   1. Acute appendicitis with generalized peritonitis and abscess, without gangrene or perforation  LAPAROSCOPY APPENDECTOMY    S/P perc drainage            PLANS       Schedule Fabian for L/S appendectomy possible open  The risks, options and alternatives were discussed at length with the patient.  The risks including bleeding, infection and possible conversion to an open procedure were covered. The possibility of other unforeseen complications including other organ injury, injury to surrounding structures, infection and abscess were mentioned.  We also discussed the conduct of the operation, probable postoperative course and the typical post operative recovery and restrictions.  The patients questions were answered. After this discussion, the patient would like to proceed with appendectomy.  Status: Outpatient  Planned anesthesia: general  He will undergo pre-operative clearance per anesthesia guidelines with risk factors listed under the past medical history diagnosis & problem list.  Perioperative discontinuation of Eliquis. Continuation of 81 mg Aspirin is acceptable      SUBJECTIVE      Fabian is a 73 y.o. male seen in the office as a hospital follow up. Pt was admitted to the hospital for a perforated appendicitis treated with antibiotics and a perc drainage due to eliquis therapy. This was managed medically. Since discharge, His symptoms have improved. He is tolerating a regular diet,

## 2024-04-22 ENCOUNTER — PREP FOR PROCEDURE (OUTPATIENT)
Dept: SURGERY | Age: 74
End: 2024-04-22

## 2024-04-22 RX ORDER — SODIUM CHLORIDE 0.9 % (FLUSH) 0.9 %
5-40 SYRINGE (ML) INJECTION PRN
Status: CANCELLED | OUTPATIENT
Start: 2024-04-22

## 2024-04-22 RX ORDER — SODIUM CHLORIDE 9 MG/ML
INJECTION, SOLUTION INTRAVENOUS PRN
Status: CANCELLED | OUTPATIENT
Start: 2024-04-22

## 2024-04-22 RX ORDER — SODIUM CHLORIDE 9 MG/ML
INJECTION, SOLUTION INTRAVENOUS CONTINUOUS
Status: CANCELLED | OUTPATIENT
Start: 2024-04-22

## 2024-04-22 RX ORDER — SODIUM CHLORIDE 0.9 % (FLUSH) 0.9 %
5-40 SYRINGE (ML) INJECTION EVERY 12 HOURS SCHEDULED
Status: CANCELLED | OUTPATIENT
Start: 2024-04-22

## 2024-04-23 NOTE — H&P
PETRONA Ashton  [facility] GENERAL SURGERY  [address]  [place] [address]  [phone]  Hospital Follow-up Evaluation in Office    Pt Name: Fabian Cadena  Date of Birth 1950   Today's Date: 4/16/2024  Medical Record Number: [id number]  Referring Provider: No ref. provider found  Primary Care Provider: Heaven Contreras APRN - NP  Chief Complaint   Patient presents with    Follow-Up from Hospital     S/p Periappendiceal abscess 3/28/24     ASSESSMENT       Diagnosis Orders   1. Acute appendicitis with generalized peritonitis and abscess, without gangrene or perforation  LAPAROSCOPY APPENDECTOMY    S/P perc drainage            PLANS       Schedule Fabian for L/S appendectomy possible open  The risks, options and alternatives were discussed at length with the patient.  The risks including bleeding, infection and possible conversion to an open procedure were covered. The possibility of other unforeseen complications including other organ injury, injury to surrounding structures, infection and abscess were mentioned.  We also discussed the conduct of the operation, probable postoperative course and the typical post operative recovery and restrictions.  The patients questions were answered. After this discussion, the patient would like to proceed with appendectomy.  Status: Outpatient  Planned anesthesia: general  He will undergo pre-operative clearance per anesthesia guidelines with risk factors listed under the past medical history diagnosis & problem list.  Perioperative discontinuation of Eliquis. Continuation of 81 mg Aspirin is acceptable      SUBJECTIVE      Fabian is a 73 y.o. male seen in the office as a hospital follow up. Pt was admitted to the hospital for a perforated appendicitis treated with antibiotics and a perc drainage due to eliquis therapy. This was managed medically. Since discharge, His symptoms have improved. He is tolerating a regular diet,

## 2024-04-23 NOTE — DISCHARGE INSTRUCTIONS
DR. JERONIMO'S DISCHARGE INSTRUCTIONS    Pt Name: Fabian Cadena  Medical Record Number: [id number]  Today's Date: 4/24/2024  GENERAL ANESTHESIA OR SEDATION   1. Do not drive or operate hazardous machinery for 24 hours.  2. Do not make important business or personal decisions for 24 hours.  3. Do not drink alcoholic beverages or use tobacco for 24 hours.  ACTIVITY INSTRUCTIONS   Do not drive for 3-5 days and avoid heavy lifting, tugging, pullings greater than 10-20 lbs until seen in the office.    DIET INSTRUCTIONS   Regular diet as tolerated.  MEDICATIONS   Prescription written for Norco. Take as directed.  Do not drink alcohol or drive while taking pain medications. You may experience dizziness or drowsiness with these medications. You may also experience constipation which can be relieved with stool softners or laxatives.  You may resume your daily prescription medication schedule unless otherwise specified.  Do not take Eliquis for 2 days.  WOUND & DRESSING INSTRUCTIONS   Always ensure you and your care giver clean hands before and after caring for the wound.  Keep dressing clean and dry for 48 hours. Change when soiled or wet.      Allow steri-strips to fall off on their own.   Ice operative site for 20 minutes 4 times a day.     May wash over incision in shower in 48 hours, but do not soak in a bath.  ABDOMINAL & LAPAROSCOPIC PROCEDURES   1. You are encouraged to get up and move around as this helps with the circulation and speeds up the healing process.  2. Breath deeply and cough from time to time. This helps to clear your lungs and helps prevent pneumonia.  3. Supporting your incision with a pillow or your hand helps to minimize discomfort and pain.  4. Laparoscopic patients may develop shoulder pain in the first 48 hours from the gas used during the procedure.  FOLLOW UP CARE, SPECIFICALLY WATCH FOR:    Fever over 101 degrees by mouth   Increased redness, warmth, hardness at operative site.   Blood soaked

## 2024-04-23 NOTE — OP NOTE
[facility]  RECORD OF OPERATION     PATIENT NAME: Fabian Cadena  MEDICAL RECORD NO. [id number]  SURGEON: PETRONA Ashton  Primary Care Physician; Heaven Contreras APRN - NP     PROCEDURE PERFORMED:  4/24/2024  PREOPERATIVE DIAGNOSES:  Pre-Op Diagnosis Codes:     * Acute appendicitis, unspecified acute appendicitis type [K35.80]   POSTOPERATIVE DIAGNOSES:  Same, path pending  PROCEDURE PERFORMED:  Laparoscopic appendectomy.  SURGEON:  Dr. cM Moon  ANESTHESIA:  General with local.   ESTIMATED BLOOD LOSS:  5 ml  SPECIMENS: The appendix sent to pathology for analysis.   COMPLICATIONS:  None immediately appreciated.     DISCUSSION: Fabian is a 73 y.o. year old male who presented to the hospital with signs and symptoms of acute appendicitis and a CT scan confirming the diagnosis. After history and physical examination was performed potential diagnostic and therapeutic modalities were discussed with the patient. Operative and nonoperative management was discussed laparoscopic and open approaches reviewed. Risks, complications, benefits were reviewed.  He was given the opportunity to ask questions. Once answered an informed consent was obtained.  The patient was brought to the operating room on 4/24/2024.     OPERATIVE FINDINGS:  At the time of laparoscopy the patient was found to have acute appendicitis without perforation or abscess.  The remainder of the abdominal viscera was unremarkable as visualized. The appendix was removed as described below.     PROCEDURE:  The patient was brought to the operating room and placed in supine position, placed under continuous cardiac telemetry, blood pressure, and pulse oximetry monitoring, placed under general anesthesia by the anesthesia department.  The anterior abdominal wall was prepped and draped in sterile fashion.  A 1 cm periumbilical incision made with #11 scalpel blade and a 10 mm Optiview port inserted into the abdomen under

## 2024-04-24 ENCOUNTER — HOSPITAL ENCOUNTER (OUTPATIENT)
Age: 74
Setting detail: OUTPATIENT SURGERY
Discharge: HOME | End: 2024-04-24
Attending: SURGERY | Admitting: SURGERY
Payer: MEDICARE

## 2024-04-24 ENCOUNTER — ANESTHESIA EVENT (OUTPATIENT)
Dept: OPERATING ROOM | Age: 74
End: 2024-04-24
Payer: MEDICARE

## 2024-04-24 ENCOUNTER — ANESTHESIA (OUTPATIENT)
Dept: OPERATING ROOM | Age: 74
End: 2024-04-24
Payer: MEDICARE

## 2024-04-24 VITALS
BODY MASS INDEX: 25.62 KG/M2 | DIASTOLIC BLOOD PRESSURE: 62 MMHG | HEART RATE: 66 BPM | OXYGEN SATURATION: 98 % | SYSTOLIC BLOOD PRESSURE: 120 MMHG | WEIGHT: 179 LBS | TEMPERATURE: 97.2 F | RESPIRATION RATE: 18 BRPM | HEIGHT: 70 IN

## 2024-04-24 DIAGNOSIS — G89.18 ACUTE POSTOPERATIVE PAIN: Primary | ICD-10-CM

## 2024-04-24 DIAGNOSIS — K35.80 ACUTE APPENDICITIS, UNSPECIFIED ACUTE APPENDICITIS TYPE: ICD-10-CM

## 2024-04-24 PROCEDURE — 7100000010 HC PHASE II RECOVERY - FIRST 15 MIN: Performed by: SURGERY

## 2024-04-24 PROCEDURE — 3700000000 HC ANESTHESIA ATTENDED CARE: Performed by: SURGERY

## 2024-04-24 PROCEDURE — 3600000003 HC SURGERY LEVEL 3 BASE: Performed by: SURGERY

## 2024-04-24 PROCEDURE — 2580000003 HC RX 258: Performed by: SURGERY

## 2024-04-24 PROCEDURE — 3700000001 HC ADD 15 MINUTES (ANESTHESIA): Performed by: SURGERY

## 2024-04-24 PROCEDURE — 7100000011 HC PHASE II RECOVERY - ADDTL 15 MIN: Performed by: SURGERY

## 2024-04-24 PROCEDURE — 2709999900 HC NON-CHARGEABLE SUPPLY: Performed by: SURGERY

## 2024-04-24 PROCEDURE — 2500000003 HC RX 250 WO HCPCS: Performed by: NURSE ANESTHETIST, CERTIFIED REGISTERED

## 2024-04-24 PROCEDURE — 6360000002 HC RX W HCPCS: Performed by: SURGERY

## 2024-04-24 PROCEDURE — 6360000002 HC RX W HCPCS: Performed by: NURSE ANESTHETIST, CERTIFIED REGISTERED

## 2024-04-24 PROCEDURE — 44970 LAPAROSCOPY APPENDECTOMY: CPT | Performed by: SURGERY

## 2024-04-24 PROCEDURE — 7100000001 HC PACU RECOVERY - ADDTL 15 MIN: Performed by: SURGERY

## 2024-04-24 PROCEDURE — 7100000000 HC PACU RECOVERY - FIRST 15 MIN: Performed by: SURGERY

## 2024-04-24 PROCEDURE — 2720000010 HC SURG SUPPLY STERILE: Performed by: SURGERY

## 2024-04-24 PROCEDURE — 88304 TISSUE EXAM BY PATHOLOGIST: CPT

## 2024-04-24 PROCEDURE — 3600000013 HC SURGERY LEVEL 3 ADDTL 15MIN: Performed by: SURGERY

## 2024-04-24 RX ORDER — HYDROCODONE BITARTRATE AND ACETAMINOPHEN 5; 325 MG/1; MG/1
1 TABLET ORAL EVERY 4 HOURS PRN
Qty: 30 TABLET | Refills: 0 | Status: SHIPPED | OUTPATIENT
Start: 2024-04-24 | End: 2024-04-29

## 2024-04-24 RX ORDER — HYDROCODONE BITARTRATE AND ACETAMINOPHEN 5; 325 MG/1; MG/1
1 TABLET ORAL EVERY 4 HOURS PRN
Status: DISCONTINUED | OUTPATIENT
Start: 2024-04-24 | End: 2024-04-24 | Stop reason: HOSPADM

## 2024-04-24 RX ORDER — NALOXONE HYDROCHLORIDE 0.4 MG/ML
INJECTION, SOLUTION INTRAMUSCULAR; INTRAVENOUS; SUBCUTANEOUS PRN
Status: DISCONTINUED | OUTPATIENT
Start: 2024-04-24 | End: 2024-04-24 | Stop reason: HOSPADM

## 2024-04-24 RX ORDER — KETAMINE HCL IN NACL, ISO-OSM 100MG/10ML
SYRINGE (ML) INJECTION PRN
Status: DISCONTINUED | OUTPATIENT
Start: 2024-04-24 | End: 2024-04-24 | Stop reason: SDUPTHER

## 2024-04-24 RX ORDER — HYDROMORPHONE HYDROCHLORIDE 2 MG/ML
INJECTION, SOLUTION INTRAMUSCULAR; INTRAVENOUS; SUBCUTANEOUS PRN
Status: DISCONTINUED | OUTPATIENT
Start: 2024-04-24 | End: 2024-04-24 | Stop reason: SDUPTHER

## 2024-04-24 RX ORDER — SODIUM CHLORIDE 0.9 % (FLUSH) 0.9 %
5-40 SYRINGE (ML) INJECTION EVERY 12 HOURS SCHEDULED
Status: DISCONTINUED | OUTPATIENT
Start: 2024-04-24 | End: 2024-04-24 | Stop reason: HOSPADM

## 2024-04-24 RX ORDER — ONDANSETRON 2 MG/ML
INJECTION INTRAMUSCULAR; INTRAVENOUS PRN
Status: DISCONTINUED | OUTPATIENT
Start: 2024-04-24 | End: 2024-04-24 | Stop reason: SDUPTHER

## 2024-04-24 RX ORDER — KETOROLAC TROMETHAMINE 30 MG/ML
INJECTION, SOLUTION INTRAMUSCULAR; INTRAVENOUS PRN
Status: DISCONTINUED | OUTPATIENT
Start: 2024-04-24 | End: 2024-04-24 | Stop reason: SDUPTHER

## 2024-04-24 RX ORDER — SODIUM CHLORIDE 9 MG/ML
INJECTION, SOLUTION INTRAVENOUS CONTINUOUS
Status: DISCONTINUED | OUTPATIENT
Start: 2024-04-24 | End: 2024-04-24 | Stop reason: HOSPADM

## 2024-04-24 RX ORDER — SODIUM CHLORIDE 0.9 % (FLUSH) 0.9 %
5-40 SYRINGE (ML) INJECTION PRN
Status: DISCONTINUED | OUTPATIENT
Start: 2024-04-24 | End: 2024-04-24 | Stop reason: HOSPADM

## 2024-04-24 RX ORDER — ONDANSETRON 2 MG/ML
4 INJECTION INTRAMUSCULAR; INTRAVENOUS EVERY 6 HOURS PRN
Status: DISCONTINUED | OUTPATIENT
Start: 2024-04-24 | End: 2024-04-24 | Stop reason: HOSPADM

## 2024-04-24 RX ORDER — SODIUM CHLORIDE 9 MG/ML
INJECTION, SOLUTION INTRAVENOUS PRN
Status: DISCONTINUED | OUTPATIENT
Start: 2024-04-24 | End: 2024-04-24 | Stop reason: HOSPADM

## 2024-04-24 RX ORDER — MORPHINE SULFATE 2 MG/ML
2 INJECTION, SOLUTION INTRAMUSCULAR; INTRAVENOUS EVERY 5 MIN PRN
Status: DISCONTINUED | OUTPATIENT
Start: 2024-04-24 | End: 2024-04-24 | Stop reason: HOSPADM

## 2024-04-24 RX ORDER — BUPIVACAINE HYDROCHLORIDE 5 MG/ML
INJECTION, SOLUTION EPIDURAL; INTRACAUDAL PRN
Status: DISCONTINUED | OUTPATIENT
Start: 2024-04-24 | End: 2024-04-24 | Stop reason: ALTCHOICE

## 2024-04-24 RX ORDER — LIDOCAINE HCL/PF 100 MG/5ML
SYRINGE (ML) INJECTION PRN
Status: DISCONTINUED | OUTPATIENT
Start: 2024-04-24 | End: 2024-04-24 | Stop reason: SDUPTHER

## 2024-04-24 RX ORDER — ROCURONIUM BROMIDE 10 MG/ML
INJECTION, SOLUTION INTRAVENOUS PRN
Status: DISCONTINUED | OUTPATIENT
Start: 2024-04-24 | End: 2024-04-24 | Stop reason: SDUPTHER

## 2024-04-24 RX ORDER — LABETALOL HYDROCHLORIDE 5 MG/ML
10 INJECTION, SOLUTION INTRAVENOUS
Status: DISCONTINUED | OUTPATIENT
Start: 2024-04-24 | End: 2024-04-24 | Stop reason: HOSPADM

## 2024-04-24 RX ORDER — PROPOFOL 10 MG/ML
INJECTION, EMULSION INTRAVENOUS PRN
Status: DISCONTINUED | OUTPATIENT
Start: 2024-04-24 | End: 2024-04-24 | Stop reason: SDUPTHER

## 2024-04-24 RX ORDER — FENTANYL CITRATE 50 UG/ML
50 INJECTION, SOLUTION INTRAMUSCULAR; INTRAVENOUS EVERY 5 MIN PRN
Status: DISCONTINUED | OUTPATIENT
Start: 2024-04-24 | End: 2024-04-24 | Stop reason: HOSPADM

## 2024-04-24 RX ORDER — DEXAMETHASONE SODIUM PHOSPHATE 10 MG/ML
INJECTION, EMULSION INTRAMUSCULAR; INTRAVENOUS PRN
Status: DISCONTINUED | OUTPATIENT
Start: 2024-04-24 | End: 2024-04-24 | Stop reason: SDUPTHER

## 2024-04-24 RX ADMIN — SODIUM CHLORIDE: 9 INJECTION, SOLUTION INTRAVENOUS at 10:25

## 2024-04-24 RX ADMIN — KETOROLAC TROMETHAMINE 15 MG: 30 INJECTION INTRAMUSCULAR; INTRAVENOUS at 10:30

## 2024-04-24 RX ADMIN — Medication 20 MG: at 10:20

## 2024-04-24 RX ADMIN — SUGAMMADEX 100 MG: 100 INJECTION, SOLUTION INTRAVENOUS at 10:40

## 2024-04-24 RX ADMIN — CEFOXITIN 2000 MG: 2 INJECTION, POWDER, FOR SOLUTION INTRAVENOUS at 10:15

## 2024-04-24 RX ADMIN — PROPOFOL 140 MG: 10 INJECTION, EMULSION INTRAVENOUS at 10:11

## 2024-04-24 RX ADMIN — ROCURONIUM BROMIDE 50 MG: 10 INJECTION INTRAVENOUS at 10:11

## 2024-04-24 RX ADMIN — SUGAMMADEX 100 MG: 100 INJECTION, SOLUTION INTRAVENOUS at 10:43

## 2024-04-24 RX ADMIN — DEXAMETHASONE SODIUM PHOSPHATE 10 MG: 10 INJECTION, EMULSION INTRAMUSCULAR; INTRAVENOUS at 10:11

## 2024-04-24 RX ADMIN — ONDANSETRON 4 MG: 2 INJECTION INTRAMUSCULAR; INTRAVENOUS at 10:11

## 2024-04-24 RX ADMIN — HYDROMORPHONE HYDROCHLORIDE 1 MG: 2 INJECTION INTRAMUSCULAR; INTRAVENOUS; SUBCUTANEOUS at 10:11

## 2024-04-24 RX ADMIN — SODIUM CHLORIDE: 9 INJECTION, SOLUTION INTRAVENOUS at 08:20

## 2024-04-24 RX ADMIN — Medication 100 MG: at 10:11

## 2024-04-24 ASSESSMENT — PAIN SCALES - GENERAL
PAINLEVEL_OUTOF10: 2

## 2024-04-24 ASSESSMENT — PAIN - FUNCTIONAL ASSESSMENT
PAIN_FUNCTIONAL_ASSESSMENT: ACTIVITIES ARE NOT PREVENTED
PAIN_FUNCTIONAL_ASSESSMENT: NONE - DENIES PAIN
PAIN_FUNCTIONAL_ASSESSMENT: 0-10

## 2024-04-24 ASSESSMENT — PAIN DESCRIPTION - PAIN TYPE: TYPE: SURGICAL PAIN

## 2024-04-24 ASSESSMENT — PAIN DESCRIPTION - DESCRIPTORS: DESCRIPTORS: BURNING

## 2024-04-24 ASSESSMENT — PAIN DESCRIPTION - ORIENTATION: ORIENTATION: MID

## 2024-04-24 ASSESSMENT — PAIN DESCRIPTION - LOCATION: LOCATION: ABDOMEN

## 2024-04-24 NOTE — PROGRESS NOTES
1047  - pt arrives to pacu, respirations unlabored on room air, pt denies pain, VSS    1100 - pt denies pain    1117 - pt meets criteria for discharge from pacu at this time, pt transported to [facility] in stable condition

## 2024-04-24 NOTE — ANESTHESIA PRE PROCEDURE
WBC 7.7 03/31/2024 05:19 AM    RBC 4.26 03/31/2024 05:19 AM    HGB 13.5 03/31/2024 05:19 AM    HCT 40.4 03/31/2024 05:19 AM    MCV 94.8 03/31/2024 05:19 AM     03/31/2024 05:19 AM       CMP:   Lab Results   Component Value Date/Time     03/31/2024 05:19 AM    K 3.6 03/31/2024 05:19 AM     03/31/2024 05:19 AM    CO2 20 03/31/2024 05:19 AM    BUN 9 03/31/2024 05:19 AM    CREATININE 0.8 03/31/2024 05:19 AM    LABGLOM >90 03/31/2024 05:19 AM    GLUCOSE 93 03/31/2024 05:19 AM    CALCIUM 8.4 03/31/2024 05:19 AM       POC Tests: No results for input(s): \"POCGLU\", \"POCNA\", \"POCK\", \"POCCL\", \"POCBUN\", \"POCHEMO\", \"POCHCT\" in the last 72 hours.    Coags: No results found for: \"PROTIME\", \"INR\", \"APTT\"    HCG (If Applicable): No results found for: \"PREGTESTUR\", \"PREGSERUM\", \"HCG\", \"HCGQUANT\"     ABGs: No results found for: \"PHART\", \"PO2ART\", \"ZHS7LSI\", \"SIW0RWB\", \"BEART\", \"R2NEMOJO\"     Type & Screen (If Applicable):  No results found for: \"LABABO\", \"LABRH\"    Drug/Infectious Status (If Applicable):  No results found for: \"HIV\", \"HEPCAB\"    COVID-19 Screening (If Applicable): No results found for: \"COVID19\"        Anesthesia Evaluation    Airway: Mallampati: II  TM distance: >3 FB   Neck ROM: full  Mouth opening: > = 3 FB   Dental:          Pulmonary: breath sounds clear to auscultation                             Cardiovascular:    (+) CAD:, dysrhythmias: atrial fibrillation, CHF:        Rhythm: regular                      Neuro/Psych:               GI/Hepatic/Renal:   (+) GERD:          Endo/Other:                     Abdominal:             Vascular:          Other Findings:       Anesthesia Plan      general     ASA 3       Induction: intravenous.    MIPS: Postoperative opioids intended and Prophylactic antiemetics administered.  Anesthetic plan and risks discussed with patient.      Plan discussed with CRNA.                Dallin Sharp MD   4/24/2024

## 2024-04-24 NOTE — ANESTHESIA POSTPROCEDURE EVALUATION
Department of Anesthesiology  Postprocedure Note    Patient: Fabian Cadena  MRN: [id number]  YOB: 1950  Date of evaluation: 4/24/2024    Procedure Summary       Date: 04/24/24 Room / Location: [facility] OR [address] [place] [address] OR    Anesthesia Start: 1008 Anesthesia Stop: 1048    Procedure: Laparoscopic Appendectomy (Abdomen) Diagnosis:       Acute appendicitis, unspecified acute appendicitis type      (Acute appendicitis, unspecified acute appendicitis type [K35.80])    Surgeons: Mc Moon DO Responsible Provider: Dallin Sharp MD    Anesthesia Type: general ASA Status: 3            Anesthesia Type: No value filed.    Sayda Phase I: Sayda Score: 10    Sayda Phase II: Sayda Score: 10    Anesthesia Post Evaluation    Patient location during evaluation: PACU  Patient participation: complete - patient participated  Level of consciousness: awake  Airway patency: patent  Nausea & Vomiting: no vomiting and no nausea  Cardiovascular status: hemodynamically stable  Respiratory status: acceptable  Hydration status: stable  Pain management: adequate    No notable events documented.

## 2024-04-24 NOTE — PROGRESS NOTES
Pt has met discharge criteria and states he is ready for discharge to home. IV removed, gauze and tape applied. Dressed in own clothes and personal belongings gathered. Discharge instructions (with opioid medication education information) given to pt and family. Pt and family verbalized understanding of discharge instructions, prescriptions and follow up appointments.

## 2024-04-24 NOTE — PROGRESS NOTES
Pt admitted to [facility] room 1 and oriented to unit. SCD sleeves applied. Nares swabbed. Pt verbalized permission for first name, last initial and physicians name on white board. SDS board and discharge criteria explained, pt and family verbalized understanding. Pt denies thoughts of harming self or others. Call light in reach. Family at the bedside.  [place] [phone]

## 2024-04-25 ENCOUNTER — TELEPHONE (OUTPATIENT)
Dept: SURGERY | Age: 74
End: 2024-04-25

## 2024-04-25 NOTE — TELEPHONE ENCOUNTER
I called pt in regards to s/p Laparoscopic appendectomy on 4/24/24.  Pt states he is doing fine without any problems.

## 2024-05-07 DIAGNOSIS — K35.210 ACUTE APPENDICITIS WITH GENERALIZED PERITONITIS AND ABSCESS, WITHOUT GANGRENE OR PERFORATION: ICD-10-CM

## 2024-05-07 PROBLEM — E78.00 PURE HYPERCHOLESTEROLEMIA: Status: ACTIVE | Noted: 2024-05-07

## 2024-05-10 ENCOUNTER — OFFICE VISIT (OUTPATIENT)
Dept: SURGERY | Age: 74
End: 2024-05-10

## 2024-05-10 VITALS
OXYGEN SATURATION: 96 % | BODY MASS INDEX: 25.74 KG/M2 | TEMPERATURE: 98.4 F | HEIGHT: 70 IN | SYSTOLIC BLOOD PRESSURE: 115 MMHG | WEIGHT: 179.8 LBS | DIASTOLIC BLOOD PRESSURE: 52 MMHG | HEART RATE: 63 BPM

## 2024-05-10 DIAGNOSIS — Z90.49 S/P LAPAROSCOPIC APPENDECTOMY: Primary | ICD-10-CM

## 2024-05-10 PROCEDURE — 99024 POSTOP FOLLOW-UP VISIT: CPT | Performed by: SURGERY

## 2024-05-10 NOTE — PROGRESS NOTES
PETRONA Ashton  [facility] GENERAL SURGERY  [address]  [place] [address]  [phone]  Post Procedure Evaluation in Office    Pt Name: Fabian Cadena  Date of Birth 1950   Today's Date: 5/11/2024  Medical Record Number: [id number]  Referring Provider: No ref. provider found  Primary Care Provider: Heaven Contreras APRN - NP  Chief Complaint   Patient presents with    Post-Op Check     S/p Laparoscopic appendectomy 4/24/24     ASSESSMENT       Diagnosis Orders   1. S/P laparoscopic appendectomy      4/24/24      Incisions are clean, dry and intact or healing as expected   PLANS   Assessment & Plan   Pathology reviewed with the patient who understands. All questions were answered.  Patient Instructions   May return to full activity without restrictions.   Follow up: Return for As needed. Instructed to call if any concerns.      SUBJECTIVE      Fabian is seen today for post-op follow-up. He is S/p L/S appendectomy 4/24/24. He is tolerating a regular diet, having regular bowel movements. Symptoms and activity have gradually improved compared to preoperative. The surgical site is clean and has no drainage. Pain is controlled without any narcotic medications. He has compliant with postoperative instructions.  Past Medical History   has a past medical history of CAD (coronary artery disease), GERD (gastroesophageal reflux disease), and Hyperlipidemia.  Past Surgical History   has a past surgical history that includes Colonoscopy; Endoscopy, colon, diagnostic; and laparoscopic appendectomy (N/A, 4/24/2024).  Medications  has a current medication list which includes the following prescription(s): apixaban, tadalafil, vitamin c, coenzyme q-10, empagliflozin, metoprolol succinate, rosuvastatin, sacubitril-valsartan, and spironolactone.  Allergies  has No Known Allergies.  Social History   reports that he has never smoked. He has never been exposed to tobacco smoke. He has

## (undated) DEVICE — STAPLER ECHELON 3000 45MM LONG

## (undated) DEVICE — TROCAR: Brand: KII FIOS FIRST ENTRY

## (undated) DEVICE — TROCAR: Brand: KII® SLEEVE

## (undated) DEVICE — GENERAL LAPAROSCOPY-LF: Brand: MEDLINE INDUSTRIES, INC.

## (undated) DEVICE — GLOVE ORANGE PI 7 1/2   MSG9075

## (undated) DEVICE — RELOAD STPL L45MM H1-2.6MM MESENTERY THN TISS WHT GRIPPING

## (undated) DEVICE — SUTURE MONOCRYL SZ 4-0 L27IN ABSRB UD L19MM PS-2 1/2 CIR PRIM Y426H

## (undated) DEVICE — BAG SPEC REM 224ML W4XL6IN DIA10MM 1 HND GYN DISP ENDOPCH

## (undated) DEVICE — TUBING INSUFFLATION SMK EVAC HI FLO SET PNEUMOCLEAR

## (undated) DEVICE — RELOAD STPL L45MM H1.5-3.6MM REG TISS BLU GRIPPING SURF B